# Patient Record
Sex: MALE | Race: WHITE | Employment: FULL TIME | URBAN - METROPOLITAN AREA
[De-identification: names, ages, dates, MRNs, and addresses within clinical notes are randomized per-mention and may not be internally consistent; named-entity substitution may affect disease eponyms.]

---

## 2018-02-28 ENCOUNTER — TRANSFERRED RECORDS (OUTPATIENT)
Dept: HEALTH INFORMATION MANAGEMENT | Facility: CLINIC | Age: 58
End: 2018-02-28

## 2020-10-19 ENCOUNTER — TRANSFERRED RECORDS (OUTPATIENT)
Dept: HEALTH INFORMATION MANAGEMENT | Facility: CLINIC | Age: 60
End: 2020-10-19

## 2020-12-31 ENCOUNTER — TRANSFERRED RECORDS (OUTPATIENT)
Dept: HEALTH INFORMATION MANAGEMENT | Facility: CLINIC | Age: 60
End: 2020-12-31

## 2020-12-31 ENCOUNTER — MEDICAL CORRESPONDENCE (OUTPATIENT)
Dept: HEALTH INFORMATION MANAGEMENT | Facility: CLINIC | Age: 60
End: 2020-12-31

## 2021-01-04 ENCOUNTER — TRANSCRIBE ORDERS (OUTPATIENT)
Dept: OTHER | Age: 61
End: 2021-01-04

## 2021-01-04 DIAGNOSIS — M25.571 CHRONIC PAIN OF RIGHT ANKLE: Primary | ICD-10-CM

## 2021-01-04 DIAGNOSIS — G89.29 CHRONIC PAIN OF RIGHT ANKLE: Primary | ICD-10-CM

## 2021-01-06 NOTE — TELEPHONE ENCOUNTER
RECORDS RECEIVED FROM: Chronic right ankle pain/ext MRI/Radha Harrison PA-C/[facility]/ortho con   DATE RECEIVED: Jan 19, 2021     NOTES STATUS DETAILS   01/06/21   11:50 AM   Called LVM for patient, need to know where MRI is  Vannessa EWELINA Ross

## 2021-01-13 DIAGNOSIS — M25.571 RIGHT ANKLE PAIN: Primary | ICD-10-CM

## 2021-01-19 ENCOUNTER — PRE VISIT (OUTPATIENT)
Dept: ORTHOPEDICS | Facility: CLINIC | Age: 61
End: 2021-01-19

## 2021-02-08 NOTE — TELEPHONE ENCOUNTER
Records Requested  02/08/21    Facility  [facility]   Outcome 10/19/2020 & 2/28/2018  MR Shoulder reports sent to scan, sent a fax for images to be pushed - [facility]     2/8/2021 1:38PM images received in PACS - [facility]

## 2021-02-16 ENCOUNTER — ANCILLARY PROCEDURE (OUTPATIENT)
Dept: GENERAL RADIOLOGY | Facility: CLINIC | Age: 61
End: 2021-02-16
Attending: ORTHOPAEDIC SURGERY
Payer: COMMERCIAL

## 2021-02-16 ENCOUNTER — OFFICE VISIT (OUTPATIENT)
Dept: ORTHOPEDICS | Facility: CLINIC | Age: 61
End: 2021-02-16
Payer: COMMERCIAL

## 2021-02-16 ENCOUNTER — PRE VISIT (OUTPATIENT)
Dept: ORTHOPEDICS | Facility: CLINIC | Age: 61
End: 2021-02-16

## 2021-02-16 VITALS — WEIGHT: 225.3 LBS | BODY MASS INDEX: 31.54 KG/M2 | HEIGHT: 71 IN

## 2021-02-16 DIAGNOSIS — M25.571 PAIN IN JOINT, ANKLE AND FOOT, RIGHT: Primary | ICD-10-CM

## 2021-02-16 DIAGNOSIS — M25.571 CHRONIC PAIN OF RIGHT ANKLE: ICD-10-CM

## 2021-02-16 DIAGNOSIS — G89.29 CHRONIC PAIN OF RIGHT ANKLE: ICD-10-CM

## 2021-02-16 DIAGNOSIS — M25.571 RIGHT ANKLE PAIN: ICD-10-CM

## 2021-02-16 PROCEDURE — 99203 OFFICE O/P NEW LOW 30 MIN: CPT | Performed by: ORTHOPAEDIC SURGERY

## 2021-02-16 PROCEDURE — 73610 X-RAY EXAM OF ANKLE: CPT | Mod: RT | Performed by: RADIOLOGY

## 2021-02-16 RX ORDER — SIMVASTATIN 40 MG
TABLET ORAL
COMMUNITY
Start: 2021-01-02

## 2021-02-16 RX ORDER — METFORMIN HCL 500 MG
TABLET, EXTENDED RELEASE 24 HR ORAL
COMMUNITY
Start: 2020-11-22

## 2021-02-16 RX ORDER — DULAGLUTIDE 1.5 MG/.5ML
INJECTION, SOLUTION SUBCUTANEOUS
COMMUNITY
Start: 2021-01-17

## 2021-02-16 RX ORDER — LISINOPRIL 20 MG/1
20 TABLET ORAL DAILY
COMMUNITY
Start: 2021-01-20

## 2021-02-16 RX ORDER — ASPIRIN 81 MG/1
81 TABLET ORAL DAILY
COMMUNITY

## 2021-02-16 ASSESSMENT — MIFFLIN-ST. JEOR: SCORE: 1871.33

## 2021-02-16 NOTE — LETTER
2/16/2021         RE: Anish Johansen  [address] [facility] [id number]        Dear Colleague,    Thank you for referring your patient, Anish Johansen, to the [facility] [place]. Please see a copy of my visit note below.    CHIEF COMPLAINT:  Right Achilles tendon pain.      HISTORY OF PRESENT ILLNESS:  Mr. Johansen is a 60-year-old male who presents today for evaluation of his right Achilles tendon.  The patient reports to have a little bit of a tweak to the Achilles tendon when he was running away from a car while crossing the street.  He reports to have sustained a sudden onset of pain, but he did not think much of it and he continued moving on through life.      It was not until 10/2020 when he had developed a sudden onset of pain and difficulties without any triggering event.  He is very clear about the fact that he has not sustained an injury or traumatic episode.      Reports now to have pain and discomfort.  Surprisingly now for the past 2 weeks, it has been improving drastically.  The pain is located along the nodule that he presents at the level of the Achilles tendon.      Denies to have any problems otherwise on the opposite extremity.      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      PAST MEDICAL HISTORY:  Significant for non-insulin dependent diabetes, high cholesterol, hypertension.  He chews tobacco as well, something that he is trying to quit.      DRUG ALLERGIES:  Penicillin.      CURRENT MEDICATIONS:  Please refer to encounter form.      PHYSICAL EXAMINATION:  On today's visit, he presents as a pleasant male in no apparent distress.  Denies to have any constitutional symptoms.  Reports to have a height of 5 feet 11 inches with a weight of 225 pounds.      On today's visit, he presents with full range of  motion of the right ankle, hindfoot and midfoot joints.  CMS intact.  Skin intact.  There is a nodule along the Achilles tendon approximately 4-5 cm proximal to the attachment, which is somewhat painful to the touch.  Otherwise, exam is unremarkable.      IMAGING:  An MRI at [facility] from a 12/31/2020 was reviewed today which is significant for showing Achilles tendinosis with fluid collection within the tendon itself.  There are no other acute findings.      ASSESSMENT:  Advanced right Achilles tendinosis.      PLAN:  I discussed with patient the natural history of his condition as well as the fact that he is not creating any irreversible damage to his foot.  For the time being, given the fact that his symptoms have drastically improved, he would like to proceed with some observation and to see how things are going to move forward.  I discussed with him that if the symptoms are aggravated to proceed with the use of ice and anti-inflammatory medication.  I do not believe that physical therapy will be beneficial as he will not provide him any significant amount of time.      From a treatment point of view, he will benefit from undergoing an Achilles tendon debridement with FHL tendon transfer, something that can be performed at any given time.      All questions were answered.  Patient was pleased with the discussion.  The patient will follow up on a p.r.n. basis.  He has no activity restrictions.      TT 30 minutes, CT 20 minutes.     Horace Perez MD

## 2021-02-16 NOTE — PROGRESS NOTES
CHIEF COMPLAINT:  Right Achilles tendon pain.      HISTORY OF PRESENT ILLNESS:  Mr. Johansen is a 60-year-old male who presents today for evaluation of his right Achilles tendon.  The patient reports to have a little bit of a tweak to the Achilles tendon when he was running away from a car while crossing the street.  He reports to have sustained a sudden onset of pain, but he did not think much of it and he continued moving on through life.      It was not until 10/2020 when he had developed a sudden onset of pain and difficulties without any triggering event.  He is very clear about the fact that he has not sustained an injury or traumatic episode.      Reports now to have pain and discomfort.  Surprisingly now for the past 2 weeks, it has been improving drastically.  The pain is located along the nodule that he presents at the level of the Achilles tendon.      Denies to have any problems otherwise on the opposite extremity.      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      PAST MEDICAL HISTORY:  Significant for non-insulin dependent diabetes, high cholesterol, hypertension.  He chews tobacco as well, something that he is trying to quit.      DRUG ALLERGIES:  Penicillin.      CURRENT MEDICATIONS:  Please refer to encounter form.      PHYSICAL EXAMINATION:  On today's visit, he presents as a pleasant male in no apparent distress.  Denies to have any constitutional symptoms.  Reports to have a height of 5 feet 11 inches with a weight of 225 pounds.      On today's visit, he presents with full range of motion of the right ankle, hindfoot and midfoot joints.  CMS intact.  Skin intact.  There is a nodule along the Achilles tendon approximately 4-5 cm proximal to the attachment, which is somewhat painful to the touch.  Otherwise, exam is unremarkable.      IMAGING:  An  MRI at [facility] from a 12/31/2020 was reviewed today which is significant for showing Achilles tendinosis with fluid collection within the tendon itself.  There are no other acute findings.      ASSESSMENT:  Advanced right Achilles tendinosis.      PLAN:  I discussed with patient the natural history of his condition as well as the fact that he is not creating any irreversible damage to his foot.  For the time being, given the fact that his symptoms have drastically improved, he would like to proceed with some observation and to see how things are going to move forward.  I discussed with him that if the symptoms are aggravated to proceed with the use of ice and anti-inflammatory medication.  I do not believe that physical therapy will be beneficial as he will not provide him any significant amount of time.      From a treatment point of view, he will benefit from undergoing an Achilles tendon debridement with FHL tendon transfer, something that can be performed at any given time.      All questions were answered.  Patient was pleased with the discussion.  The patient will follow up on a p.r.n. basis.  He has no activity restrictions.      TT 30 minutes, CT 20 minutes.

## 2021-02-16 NOTE — NURSING NOTE
"Reason For Visit:   Chief Complaint   Patient presents with     Consult     Right achilles tendinopathy for 2 years. Startd after feeling a \"pop\" while jogging. Referred by NEVIN Slater       Pain Assessment  Patient Currently in Pain: No                                                 "

## 2021-04-11 ENCOUNTER — HEALTH MAINTENANCE LETTER (OUTPATIENT)
Age: 61
End: 2021-04-11

## 2021-09-26 ENCOUNTER — HEALTH MAINTENANCE LETTER (OUTPATIENT)
Age: 61
End: 2021-09-26

## 2022-05-08 ENCOUNTER — HEALTH MAINTENANCE LETTER (OUTPATIENT)
Age: 62
End: 2022-05-08

## 2023-01-08 ENCOUNTER — HEALTH MAINTENANCE LETTER (OUTPATIENT)
Age: 63
End: 2023-01-08

## 2023-06-02 ENCOUNTER — HEALTH MAINTENANCE LETTER (OUTPATIENT)
Age: 63
End: 2023-06-02